# Patient Record
Sex: FEMALE | Race: OTHER | URBAN - METROPOLITAN AREA
[De-identification: names, ages, dates, MRNs, and addresses within clinical notes are randomized per-mention and may not be internally consistent; named-entity substitution may affect disease eponyms.]

---
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
## 2024-01-15 ENCOUNTER — OUTPATIENT (OUTPATIENT)
Dept: OUTPATIENT SERVICES | Facility: HOSPITAL | Age: 19
LOS: 1 days | End: 2024-01-15
Payer: MEDICAID

## 2024-01-15 VITALS
DIASTOLIC BLOOD PRESSURE: 63 MMHG | TEMPERATURE: 98 F | SYSTOLIC BLOOD PRESSURE: 102 MMHG | RESPIRATION RATE: 18 BRPM | HEART RATE: 101 BPM | HEART RATE: 98 BPM | DIASTOLIC BLOOD PRESSURE: 63 MMHG | SYSTOLIC BLOOD PRESSURE: 102 MMHG

## 2024-01-15 VITALS
SYSTOLIC BLOOD PRESSURE: 117 MMHG | RESPIRATION RATE: 18 BRPM | DIASTOLIC BLOOD PRESSURE: 71 MMHG | TEMPERATURE: 98 F | HEART RATE: 104 BPM

## 2024-01-15 DIAGNOSIS — O26.899 OTHER SPECIFIED PREGNANCY RELATED CONDITIONS, UNSPECIFIED TRIMESTER: ICD-10-CM

## 2024-01-15 PROCEDURE — 99212 OFFICE O/P EST SF 10 MIN: CPT | Mod: GC

## 2024-01-15 PROCEDURE — G0463: CPT

## 2024-01-15 NOTE — OB PROVIDER TRIAGE NOTE - HISTORY OF PRESENT ILLNESS
R1 Triage Note    Pt is a 17y/o  at 33+6 seen in triage for decFM and abdominal pain. +FM, -LOF, -ctx, -VB.  Prenatal course has been uncomplicated, she sees Dr. Calvin and Dr. Miguel Ángel Gonzales at [facility]. She reports that yesterday she started feeling sharp abdominal pain on the top L of her uterus. It subsided and she fell asleep last night with no issues. She felt fine throughout the day but again at 4PM she started feeling sharp abdominal pain in the same spot 8.5/10 that is constant and does not come and go. She reports it's worse when she's moving. She also reports that for the last 3 hours she feels like she's not feeling the baby move as much. She also endorses constipation. She otherwise denies chest pain, SOB, N/V, RUQ pain, dysuria, diarrhea, vision changes. She has occasional HA during the pregnancy which get better with Vicks vapo rub and Tylenol.  EFW 5#4    OBHx:   GynHx: denies h/o abnormal paps, STI's, fibroids, cysts  PMHx: denies, previous lumbar puncture for unknown reasons  PSHx: denies  Med: PNV  All: NKDA  SH: denies alcohol, tobacco, or drug use  Psych: anxiety, sees a therapist   R1 Triage Note    Pt is a 19y/o  at 33+6 seen in triage for decFM and abdominal pain. +FM, -LOF, -ctx, -VB.  Prenatal course has been uncomplicated, she sees Dr. Calvin and Dr. Miguel Ángel Gonzales at [facility]. She reports that yesterday she started feeling sharp abdominal pain on the top L of her uterus. It subsided and she fell asleep last night with no issues. She felt fine throughout the day but again at 4PM she started feeling sharp abdominal pain in the same spot 8.5/10 that is constant and does not come and go. She reports it's worse when she's moving. She also reports that for the last 3 hours she feels like she's not feeling the baby move as much. She also endorses constipation. She otherwise denies chest pain, SOB, N/V, RUQ pain, dysuria, diarrhea, vision changes. She has occasional HA during the pregnancy which get better with Vicks vapo rub and Tylenol.  EFW 5#4    OBHx:   GynHx: denies h/o abnormal paps, STI's, fibroids, cysts  PMHx: denies, previous lumbar puncture for unknown reasons  PSHx: denies  Med: PNV  All: NKDA  SH: denies alcohol, tobacco, or drug use  Psych: anxiety, sees a therapist

## 2024-01-15 NOTE — OB PROVIDER TRIAGE NOTE - ATTENDING COMMENTS
ATTG note    Pt seen with and agree with above PGY1 note    17 yo P0 @ 33.6 wks with PNC with provider in [place] c/o abd pain and dec FM.  Pain is point tenderness in center of abdomen.  Denies ctxs/LOF/VB.  Uncomplicated PNC as per pt.    VSS, afebrile  VE-1/long/high/+tone  ABd-gravid, Tender to touch over area with fetal back and butt  NST-reactive with no ctxs  BPP-8/8, vtx, VINICIO-7    17 yo P0 @ 33.6 wks for abd pain that on exam is more cw point tendnerness over abd with fetal parts.  No signs of PTL.  Low normal VINICIO could also be contributing to feeling fetal movement with increased discomfort.  Pain not warranting pain meds.  Pt reassuured and clinically stable.  -stable to dc to home  -Keep scheduled apt with provider  -wants to deliver at [facility] but unable to find provider to take her insurance - encouraged to at least bring records with labs abd u.s  -reviewed precautions to return    ÁNGELA Stacy ATTG note    Pt seen with and agree with above PGY1 note    17 yo P0 @ 33.6 wks with PNC with provider in [place] c/o abd pain and dec FM.  Pain is point tenderness in center of abdomen.  Denies ctxs/LOF/VB.  Uncomplicated PNC as per pt.    VSS, afebrile  VE-1/long/high/+tone  ABd-gravid, Tender to touch over area with fetal back and butt  NST-reactive with no ctxs  BPP-8/8, vtx, VINICIO-7    17 yo P0 @ 33.6 wks for abd pain that on exam is more cw point tendnerness over abd with fetal parts.  No signs of PTL.  Low normal VINICIO could also be contributing to feeling fetal movement with increased discomfort.  Pain not warranting pain meds.  Pt reassuured and clinically stable.  -stable to dc to home  -Keep scheduled apt with provider  -wants to deliver at [facility] but unable to find provider to take her insurance - encouraged to at least bring records with labs abd u.s  -reviewed precautions to return    ÁNGELA Stacy

## 2024-01-15 NOTE — OB RN TRIAGE NOTE - FALL HARM RISK - UNIVERSAL INTERVENTIONS
Bed in lowest position, wheels locked, appropriate side rails in place/Call bell, personal items and telephone in reach/Instruct patient to call for assistance before getting out of bed or chair/Non-slip footwear when patient is out of bed/[place] to call system/Physically safe environment - no spills, clutter or unnecessary equipment/Purposeful Proactive Rounding/Room/bathroom lighting operational, light cord in reach

## 2024-01-15 NOTE — OB PROVIDER TRIAGE NOTE - NSOBPROVIDERNOTE_OBGYN_ALL_OB_FT
A&P: Patient is a 17yo  at 33+6 seen in triage for abdominal pain and decreased fetal movement.    - BPP 8/8, VINICIO 7.2  - NST reactive  - Abdominal discomfort likely related to fetal position, patient acknowledges the area of "hardness" she was feeling is where the fetal rump is. Patient has small BMI and is likely feeling increased discomfort as fetus is growing. Return precautions reviewed with the patient. Does not want to deliver at [facility], recommended that if the patient comes to [facility] to deliver to bring her prenatal labs.     JENNIFER Jordan PGY1  d/w Dr. Stacy A&P: Patient is a 19yo  at 33+6 seen in triage for abdominal pain and decreased fetal movement.    - BPP 8/8, VINICIO 7.2  - NST reactive  - Abdominal discomfort likely related to fetal position, patient acknowledges the area of "hardness" she was feeling is where the fetal rump is. Patient has small BMI and is likely feeling increased discomfort as fetus is growing. Return precautions reviewed with the patient. Does not want to deliver at [facility], recommended that if the patient comes to [facility] to deliver to bring her prenatal labs.     JENNIFER Jordan PGY1  d/w Dr. Stacy A&P: Patient is a 17yo  at 33+6 seen in triage for abdominal pain and decreased fetal movement.    - BPP 8/8, VINICIO 7.2  - NST reactive  - Abdominal discomfort likely related to fetal position, patient acknowledges the area of "hardness" she was feeling is where the fetal rump is. Patient has small BMI and is likely feeling increased discomfort as fetus is growing. Return precautions reviewed with the patient. Does not want to deliver at [facility], recommended that if the patient comes to [facility] to deliver to bring her prenatal labs.     JENNIFER Jordan PGY1  seen with and d/w Dr. Stacy A&P: Patient is a 19yo  at 33+6 seen in triage for abdominal pain and decreased fetal movement.    - BPP 8/8, VINICIO 7.2  - NST reactive  - Abdominal discomfort likely related to fetal position, patient acknowledges the area of "hardness" she was feeling is where the fetal rump is. Patient has small BMI and is likely feeling increased discomfort as fetus is growing. Return precautions reviewed with the patient. Does not want to deliver at [facility], recommended that if the patient comes to [facility] to deliver to bring her prenatal labs.     JENNIFER Jordan PGY1  seen with and d/w Dr. Stacy

## 2024-01-15 NOTE — OB PROVIDER TRIAGE NOTE - NSHPPHYSICALEXAM_GEN_ALL_CORE
EFH: 145/mod/+accels, no decels  [place]: no contractions  VE: 1/0/-3  TAUS: vertex    BPP 8/8, VINICIO 7.2, R posterior placenta  Fetal rump at the area of abdominal discomfort    General: comfortable, NAD  Pulm: unlabored breathing  Abd: gravid, soft, nontender, no RUQ, epigatric or LUQ tenderness. Slight tenderness to palpation over the anterior uterus just below the fundus EFH: 145/mod/+accels, no decels  [place]: no contractions  VE: 1/0/-3  TAUS: vertex    BPP 8/8, VINICIO 7.2, R posterior placenta  Fetal rump at the area of abdominal discomfort    General: comfortable, NAD  Pulm: unlabored breathing  Abd: gravid, soft, nontender, no RUQ, epigatric or LUQ tenderness. Slight tenderness to palpation over the anterior uterus just below the fundus

## 2024-01-17 DIAGNOSIS — O36.8130 DECREASED FETAL MOVEMENTS, THIRD TRIMESTER, NOT APPLICABLE OR UNSPECIFIED: ICD-10-CM

## 2024-01-17 DIAGNOSIS — O99.613 DISEASES OF THE DIGESTIVE SYSTEM COMPLICATING PREGNANCY, THIRD TRIMESTER: ICD-10-CM

## 2024-01-17 DIAGNOSIS — O99.343 OTHER MENTAL DISORDERS COMPLICATING PREGNANCY, THIRD TRIMESTER: ICD-10-CM

## 2024-01-17 DIAGNOSIS — K59.00 CONSTIPATION, UNSPECIFIED: ICD-10-CM

## 2024-01-17 DIAGNOSIS — O26.893 OTHER SPECIFIED PREGNANCY RELATED CONDITIONS, THIRD TRIMESTER: ICD-10-CM

## 2024-01-17 DIAGNOSIS — Z3A.33 33 WEEKS GESTATION OF PREGNANCY: ICD-10-CM

## 2024-01-17 DIAGNOSIS — R10.9 UNSPECIFIED ABDOMINAL PAIN: ICD-10-CM

## 2024-01-17 DIAGNOSIS — R51.9 HEADACHE, UNSPECIFIED: ICD-10-CM

## 2024-01-17 DIAGNOSIS — F41.9 ANXIETY DISORDER, UNSPECIFIED: ICD-10-CM

## 2024-01-31 ENCOUNTER — OUTPATIENT (OUTPATIENT)
Dept: OUTPATIENT SERVICES | Facility: HOSPITAL | Age: 19
LOS: 1 days | End: 2024-01-31
Payer: MEDICAID

## 2024-01-31 DIAGNOSIS — O26.899 OTHER SPECIFIED PREGNANCY RELATED CONDITIONS, UNSPECIFIED TRIMESTER: ICD-10-CM

## 2024-01-31 RX ORDER — SODIUM CHLORIDE 9 MG/ML
1000 INJECTION, SOLUTION INTRAVENOUS ONCE
Refills: 0 | Status: DISCONTINUED | OUTPATIENT
Start: 2024-01-31 | End: 2024-02-15

## 2024-01-31 RX ORDER — SODIUM CHLORIDE 9 MG/ML
500 INJECTION, SOLUTION INTRAVENOUS ONCE
Refills: 0 | Status: COMPLETED | OUTPATIENT
Start: 2024-01-31 | End: 2024-01-31

## 2024-01-31 RX ORDER — SODIUM CHLORIDE 9 MG/ML
1000 INJECTION, SOLUTION INTRAVENOUS
Refills: 0 | Status: DISCONTINUED | OUTPATIENT
Start: 2024-01-31 | End: 2024-02-01

## 2024-01-31 RX ADMIN — SODIUM CHLORIDE 1000 MILLILITER(S): 9 INJECTION, SOLUTION INTRAVENOUS at 18:55

## 2024-01-31 NOTE — OB RN TRIAGE NOTE - NSICDXNOPASTSURGICALHX_GEN_ALL_CORE
Plan:  Keep up the good work with your exercise and diet plan!  We will plan to follow-up again in 1 year.  Please let us know if any concerns arise in the meantime.   <-- Click to add NO significant Past Surgical History

## 2024-01-31 NOTE — OB PROVIDER TRIAGE NOTE - HISTORY OF PRESENT ILLNESS
18yF  @36w1d presents to triage with decreased fetal movement. Patient had decreased fetal movement since yesterday morning. Normally the baby is active on its own but she has only felt movement when eating and when "poking" the baby.  Denies vaginal bleeding, leakage of fluid. Patient goes to [facility] for prenatal care but wanted to deliver at [place].    Patient has been following up with Dr. Puri for pre- care. No complications throughout current pregnancy. No adverse reactions to anesthesia, no objections to blood transfusions if clinically indicated.    PMH: No pertinent PMH  PSH: None   Past OB/GYN Hx:   Meds: PNV  All: NKDA  Social Hx: Denies alcohol, tobacco, drug use. Patient has anxiety, no meds.     PHYSICAL EXAM  Vital Signs Last 24 Hrs  T(C): 36.8 (2024 17:39), Max: 36.8 (2024 17:39)  T(F): 98.2 (2024 17:39), Max: 98.2 (2024 17:39)  HR: 97 (2024 18:21) (86 - 100)  BP: 105/70 (2024 17:39) (105/70 - 117/77)  BP(mean): --  RR: 18 (2024 17:39) (18 - 19)  SpO2: 99% (2024 18:21) (93% - 100%)    Parameters below as of 2024 17:39  Patient On (Oxygen Delivery Method): room air        Gen: NAD  Head: NC/AT  Cardio: S1S2+, RRR  Resp: CTABL, no wheezing  Abdomen: Soft, NT/ND, BS+  Extremities: No LE edema bilaterally    FHR: HR 130s, moderate variability, accelerations present, no decelerations, Category 1.  [place]: Contractions present, regular, <5 contractions over 10 minutes.    Labs

## 2024-01-31 NOTE — OB PROVIDER TRIAGE NOTE - NSOBPROVIDERNOTE_OBGYN_ALL_OB_FT
Asessment: 18yF  at 36w1d presents to triage c/o dec fetal movement since yesterday morning. VINICIO: 5.3 on ultrasound, BPP:8/8.     Plan:  - Will continue antepartum care and management.  - LR Bolus x 1L, LR@125cc/hr for maintenance.  - NPO except meds/ice chips.  - Monitor FHT/toco.    D/w Dr. Cristian Aviles, PGY-1 Asessment: 18yF  at 36w1d presents to triage c/o dec fetal movement since yesterday morning. VINICIO: 5.3 on ultrasound, BPP:8/8.     Plan:  - Will continue antepartum care and management.  - LR Bolus x 1L, LR@125cc/hr for maintenance.  - NPO except meds/ice chips.  - Monitor FHT/toco.    D/w Dr. Cristian Aviles, PGY-1      Addendum 8:30p  - Repeat VINICIO 5.92, MVP 2.8   - patient to receive ATU scan in AM  - observe overnight  - continue IVFH  - will continue to monitor    Lisha Galicia PGY1 Asessment: 18yF  at 36w1d presents to triage c/o dec fetal movement since yesterday morning. VINICIO: 5.3 on ultrasound, BPP:8/8.     Plan:  - Will continue antepartum care and management.  - LR Bolus x 1L, LR@125cc/hr for maintenance.  - NPO except meds/ice chips.  - Monitor FHT/toco.    D/w Dr. Cristian Aviles, PGY-1      Update 24 9AM   - ROR neg, amnisure(-), pooling(-)   - For ATU this AM   - Continue IVFH        Addendum 8:30p  - Repeat VINICIO 5.92, MVP 2.8   - patient to receive ATU scan in AM  - observe overnight  - continue IVFH  - will continue to monitor    Lisha Galicia PGY1 Asessment: 18yF  at 36w1d presents to triage c/o dec fetal movement since yesterday morning. VINICIO: 5.3 on ultrasound, BPP:8/8.     Plan:  - Will continue antepartum care and management.  - LR Bolus x 1L, LR@125cc/hr for maintenance.  - NPO except meds/ice chips.  - Monitor FHT/toco.    D/w Dr. Cristian Aviles, PGY-1      Update 24 9AM   - ROR neg, amnisure(-), pooling(-)   - For ATU this AM   - Continue IVFH        Addendum 8:30p  - Repeat VINICIO 5.92, MVP 2.8   - patient to receive ATU scan in AM  - observe overnight  - continue IVFH  - will continue to monitor    Lisha Galicia PGY1    PA Addendum  ATU sono vinicio 10.7, BPP 10/10  GBS swab sent  DC home with labor precautions  DW Dr Donnie Escobar PAFridaC

## 2024-01-31 NOTE — OB RN TRIAGE NOTE - NSLDARRIVAL_OBGYN_ALL_OB_DIFF_HHMM
pt c/o right ankle redness. has script from pmd to go to hospital for iv abx 19 Hour(s) 45 Minute(s)

## 2024-02-01 ENCOUNTER — ASOB RESULT (OUTPATIENT)
Age: 19
End: 2024-02-01

## 2024-02-01 ENCOUNTER — APPOINTMENT (OUTPATIENT)
Dept: ANTEPARTUM | Facility: CLINIC | Age: 19
End: 2024-02-01
Payer: MEDICAID

## 2024-02-01 VITALS — DIASTOLIC BLOOD PRESSURE: 65 MMHG | SYSTOLIC BLOOD PRESSURE: 106 MMHG | HEART RATE: 90 BPM

## 2024-02-01 PROCEDURE — 76816 OB US FOLLOW-UP PER FETUS: CPT

## 2024-02-01 PROCEDURE — G0463: CPT

## 2024-02-01 PROCEDURE — 76816 OB US FOLLOW-UP PER FETUS: CPT | Mod: 26

## 2024-02-01 PROCEDURE — 76818 FETAL BIOPHYS PROFILE W/NST: CPT

## 2024-02-01 PROCEDURE — G0378: CPT

## 2024-02-01 PROCEDURE — 96361 HYDRATE IV INFUSION ADD-ON: CPT

## 2024-02-01 PROCEDURE — 76818 FETAL BIOPHYS PROFILE W/NST: CPT | Mod: 26,59

## 2024-02-01 PROCEDURE — 96360 HYDRATION IV INFUSION INIT: CPT

## 2024-02-01 PROCEDURE — 87653 STREP B DNA AMP PROBE: CPT

## 2024-02-01 RX ADMIN — SODIUM CHLORIDE 125 MILLILITER(S): 9 INJECTION, SOLUTION INTRAVENOUS at 00:46

## 2024-02-02 DIAGNOSIS — F41.9 ANXIETY DISORDER, UNSPECIFIED: ICD-10-CM

## 2024-02-02 DIAGNOSIS — Z3A.36 36 WEEKS GESTATION OF PREGNANCY: ICD-10-CM

## 2024-02-02 DIAGNOSIS — O36.8130 DECREASED FETAL MOVEMENTS, THIRD TRIMESTER, NOT APPLICABLE OR UNSPECIFIED: ICD-10-CM

## 2024-02-02 DIAGNOSIS — O99.343 OTHER MENTAL DISORDERS COMPLICATING PREGNANCY, THIRD TRIMESTER: ICD-10-CM

## 2024-02-02 PROBLEM — Z00.00 ENCOUNTER FOR PREVENTIVE HEALTH EXAMINATION: Status: ACTIVE | Noted: 2024-02-02

## 2024-02-02 LAB
GROUP B BETA STREP DNA (PCR): SIGNIFICANT CHANGE UP
SOURCE GROUP B STREP: SIGNIFICANT CHANGE UP

## 2024-02-05 ENCOUNTER — ASOB RESULT (OUTPATIENT)
Age: 19
End: 2024-02-05

## 2024-02-05 ENCOUNTER — OUTPATIENT (OUTPATIENT)
Dept: OUTPATIENT SERVICES | Facility: HOSPITAL | Age: 19
LOS: 1 days | End: 2024-02-05
Payer: MEDICAID

## 2024-02-05 ENCOUNTER — APPOINTMENT (OUTPATIENT)
Dept: ANTEPARTUM | Facility: CLINIC | Age: 19
End: 2024-02-05
Payer: MEDICAID

## 2024-02-05 VITALS
OXYGEN SATURATION: 99 % | DIASTOLIC BLOOD PRESSURE: 71 MMHG | SYSTOLIC BLOOD PRESSURE: 110 MMHG | RESPIRATION RATE: 18 BRPM | TEMPERATURE: 98 F | HEART RATE: 87 BPM

## 2024-02-05 VITALS — HEART RATE: 93 BPM | SYSTOLIC BLOOD PRESSURE: 118 MMHG | DIASTOLIC BLOOD PRESSURE: 74 MMHG

## 2024-02-05 DIAGNOSIS — O26.899 OTHER SPECIFIED PREGNANCY RELATED CONDITIONS, UNSPECIFIED TRIMESTER: ICD-10-CM

## 2024-02-05 PROCEDURE — G0463: CPT

## 2024-02-05 PROCEDURE — 76819 FETAL BIOPHYS PROFIL W/O NST: CPT | Mod: 26

## 2024-02-05 PROCEDURE — 99212 OFFICE O/P EST SF 10 MIN: CPT | Mod: 25

## 2024-02-05 PROCEDURE — 83986 ASSAY PH BODY FLUID NOS: CPT

## 2024-02-05 PROCEDURE — 83986 ASSAY PH BODY FLUID NOS: CPT | Mod: QW

## 2024-02-05 NOTE — OB PROVIDER TRIAGE NOTE - HISTORY OF PRESENT ILLNESS
19yo P0  @  36w6d    presents from OB office for evaluation secondary to VINICIO 4-5 and decreased FM  Pt states she was seen here on 1/31 for dec FM and low fluid initially vinicio 5 and then after IVF hydration her vinicio was 10 and she was feeling the baby move and was discharge home.  Today in the office she expressed the baby isnt really moving and repeat us vinicio was 4-5.  Denies LOF or VB or ctx.    PNC: [facility] Dr Puri  PNI: uncomplicated  PNL: GBS negative      All: No Known Allergies  Meds: none  PMHx: Denies  PSHx: Denies  Socialhx: Denies x 3, denies anxiety or depression  OBhx: Primigravid  GYNhx: Denies fibroids, ov cysts or STDs or abnormal pap smears    T(C): 36.7 (02-05-24 @ 12:40), Max: 36.7 (02-05-24 @ 12:38)  HR: 92 (02-05-24 @ 13:01) (87 - 112)  BP: 110/71 (02-05-24 @ 12:40) (110/71 - 110/71)  RR: 18 (02-05-24 @ 12:38) (18 - 18)  SpO2: 100% (02-05-24 @ 13:01) (91% - 100%)    Gen: NAD  Heart: RRR  Lungs: CTA B/L  Abdomen: Gravid, NT  Ext: no calf tenderness    NST: 140's moderate variablity + accels no decels  TOCO:none  VE: 1/50/-3  EFW: 2800      A/P 19yo P 0  @  36w6d   presents with oligohydramnios  - Fetal status - NST/TOCO  - PO hydrate  - GBS neg  - repeat BPP    D/W    Ewa Chambers PA-C     17yo P0  @  36w6d    presents from OB office for evaluation secondary to VINICIO 4-5 and decreased FM  Pt states she was seen here on 1/31 for dec FM and low fluid initially vinicio 5 and then after IVF hydration her vinicio was 10 and she was feeling the baby move and was discharge home.  Today in the office she expressed the baby isnt really moving and repeat us vinicio was 4-5.  Denies LOF or VB or ctx.    PNC: [facility] Dr Puri  PNI: uncomplicated  PNL: GBS negative      All: No Known Allergies  Meds: none  PMHx: Denies  PSHx: Denies  Socialhx: Denies x 3, denies anxiety or depression  OBhx: Primigravid  GYNhx: Denies fibroids, ov cysts or STDs or abnormal pap smears    T(C): 36.7 (02-05-24 @ 12:40), Max: 36.7 (02-05-24 @ 12:38)  HR: 92 (02-05-24 @ 13:01) (87 - 112)  BP: 110/71 (02-05-24 @ 12:40) (110/71 - 110/71)  RR: 18 (02-05-24 @ 12:38) (18 - 18)  SpO2: 100% (02-05-24 @ 13:01) (91% - 100%)    Gen: NAD  Heart: RRR  Lungs: CTA B/L  Abdomen: Gravid, NT  Ext: no calf tenderness    NST: 140's moderate variablity + accels no decels  TOCO:none  VE: 1/50/-3  EFW: 2529gm - 17% vtx, VINICIO 10.7 (2/1)      A/P 17yo P 0  @  36w6d   presents with oligohydramnios  - Fetal status - NST/TOCO  - PO hydrate  - GBS neg  - repeat BPP    D/W    Ewa Chambers PA-C     17yo P0  @  36w6d    presents from OB office for evaluation secondary to VINICIO 4-5 and decreased FM  Pt states she was seen here on 1/31 for dec FM and low fluid initially vinicio 5 and then after IVF hydration her vinicio was 10 and she was feeling the baby move and was discharged home.  Today in the office she expressed the baby isnt really moving and repeat us vinicio was 4-5.  Denies LOF or VB or ctx.    PNC: [facility] Dr Puri  PNI: uncomplicated  PNL: GBS negative      All: No Known Allergies  Meds: none  PMHx: Denies  PSHx: Denies  Socialhx: Denies x 3, denies anxiety or depression  OBhx: Primigravid  GYNhx: Denies fibroids, ov cysts or STDs or abnormal pap smears    T(C): 36.7 (02-05-24 @ 12:40), Max: 36.7 (02-05-24 @ 12:38)  HR: 92 (02-05-24 @ 13:01) (87 - 112)  BP: 110/71 (02-05-24 @ 12:40) (110/71 - 110/71)  RR: 18 (02-05-24 @ 12:38) (18 - 18)  SpO2: 100% (02-05-24 @ 13:01) (91% - 100%)    Gen: NAD  Heart: RRR  Lungs: CTA B/L  Abdomen: Gravid, NT  Ext: no calf tenderness    NST: 140's moderate variablity + accels no decels  TOCO:none  VE: 1/50/-3  EFW: 2529gm - 17% vtx, VINICIO 10.7 (2/1)      A/P 17yo P 0  @  36w6d   presents with oligohydramnios and dec FM  - Fetal status - NST/TOCO  - PO hydrate  - GBS neg  - repeat BPP    D/W  Dr Jennie Chambers PA-C

## 2024-02-05 NOTE — OB PROVIDER TRIAGE NOTE - NS ATTEND AMEND GEN_ALL_CORE FT
OB  Note    17 yo P0 at 36w6d sent in from OB office ([facility]) for decreased fetal movement and VINICIO 4-5cm.   SVE /-3, ruptured membranes ruled out.   GBS negative but has records in her car.   Patient PO hydrating. Patient is normotensive.   Reactive NST. Last growth 2529g (17%ile).   My BPP is 10/10 with VINICIO 5.5cm (MVP 3cm); fetus urinated during scan with subsequent MVP noted of 4.55cm.   At this time, there is no indication for iatrogenic  delivery given BPP 10x10 with presence of adequate fluid pocket and VINICIO >5cm. Patient to request follow up with [facility] within the next week.     Yousif Schneider MD
0 = understands/communicates without difficulty

## 2024-02-05 NOTE — OB PROVIDER TRIAGE NOTE - NSOBPROVIDERNOTE_OBGYN_ALL_OB_FT
A/P 19yo P 0  @  36w6d   presents with oligohydramnios  - Fetal status - NST/TOCO  - PO hydrate  - GBS neg  - repeat BPP    D/W    Ewa Chambers PA-C A/P 17yo P 0  @  36w6d   presents with oligohydramnios and dec FM  - Fetal status - NST/TOCO  - PO hydrate  - GBS neg  - repeat BPP    D/W    Ewa Chambers PA-C    OB PA Addendum @ 2pm    T(C): 36.7 (02-05-24 @ 12:40), Max: 36.7 (02-05-24 @ 12:38)  HR: 93 (02-05-24 @ 13:59) (80 - 112)  BP: 118/74 (02-05-24 @ 13:59) (110/71 - 118/74)  RR: 18 (02-05-24 @ 12:38) (18 - 18)  SpO2: 99% (02-05-24 @ 13:58) (91% - 100%)    Pt now feeling FM in triage, denies other complaints  Ultrasound performed by Dr Schneider   vtx, VINICIO 5.53,    reactive  No evidence of ruptured membranes on spec exam, no evidence of labor  D/W Dr Schneider  plan is to discharge patient home  pt to f/u in office this week  daily FKC  increase hydration  labor precautions given    Ewa Chambers PA-C

## 2024-02-05 NOTE — OB PROVIDER TRIAGE NOTE - ADDITIONAL INSTRUCTIONS
D/W Dr Schneider  plan is to discharge patient home  pt to f/u in office this week  daily FKC  increase hydration  labor precautions given    Ewa Chambers PA-C

## 2024-02-07 DIAGNOSIS — F41.9 ANXIETY DISORDER, UNSPECIFIED: ICD-10-CM

## 2024-02-07 DIAGNOSIS — Z3A.36 36 WEEKS GESTATION OF PREGNANCY: ICD-10-CM

## 2024-02-07 DIAGNOSIS — O99.343 OTHER MENTAL DISORDERS COMPLICATING PREGNANCY, THIRD TRIMESTER: ICD-10-CM

## 2024-02-07 DIAGNOSIS — O36.8130 DECREASED FETAL MOVEMENTS, THIRD TRIMESTER, NOT APPLICABLE OR UNSPECIFIED: ICD-10-CM

## 2024-02-07 DIAGNOSIS — O41.03X0 OLIGOHYDRAMNIOS, THIRD TRIMESTER, NOT APPLICABLE OR UNSPECIFIED: ICD-10-CM

## 2024-02-19 ENCOUNTER — OUTPATIENT (OUTPATIENT)
Dept: OUTPATIENT SERVICES | Facility: HOSPITAL | Age: 19
LOS: 1 days | End: 2024-02-19
Payer: MEDICAID

## 2024-02-19 VITALS
HEART RATE: 105 BPM | RESPIRATION RATE: 17 BRPM | SYSTOLIC BLOOD PRESSURE: 117 MMHG | DIASTOLIC BLOOD PRESSURE: 81 MMHG | TEMPERATURE: 98 F

## 2024-02-19 VITALS
SYSTOLIC BLOOD PRESSURE: 128 MMHG | DIASTOLIC BLOOD PRESSURE: 84 MMHG | TEMPERATURE: 99 F | RESPIRATION RATE: 20 BRPM | HEART RATE: 102 BPM

## 2024-02-19 DIAGNOSIS — O26.899 OTHER SPECIFIED PREGNANCY RELATED CONDITIONS, UNSPECIFIED TRIMESTER: ICD-10-CM

## 2024-02-19 PROCEDURE — 99213 OFFICE O/P EST LOW 20 MIN: CPT | Mod: GC

## 2024-02-19 PROCEDURE — G0463: CPT

## 2024-02-19 RX ORDER — ACETAMINOPHEN 500 MG
975 TABLET ORAL ONCE
Refills: 0 | Status: COMPLETED | OUTPATIENT
Start: 2024-02-19 | End: 2024-02-19

## 2024-02-19 RX ADMIN — Medication 975 MILLIGRAM(S): at 18:02

## 2024-02-19 NOTE — OB PROVIDER TRIAGE NOTE - HISTORY OF PRESENT ILLNESS
R2 Triage    Subjective  HPI: 17yo  at 38w6d presents for decreased fetal movement. Reports baby was moving normally until 9 am this morning. After that she only felt baby move once. She is tolerating PO and ate breakfast and lunch today. -LOF. -CTXs. -VB. Pt also reports tooth pain and a sore on her right upper lip. Denies sores on other parts of the body.    PNC: Denies prenatal issues. GBS nrg.  EFW 5#9 on .  OBHx: G1  GynHx: denies  PMH: Anxiety, previously saw therapist  PSH: denies  Meds: PNV   Allergies: NKDA

## 2024-02-19 NOTE — OB PROVIDER TRIAGE NOTE - NSHPPHYSICALEXAM_GEN_ALL_CORE
Objective  VS  T(C): 37 (02-19-24 @ 17:16)  HR: 104 (02-19-24 @ 18:12)  BP: 128/84 (02-19-24 @ 17:16)  RR: 20 (02-19-24 @ 17:16)  SpO2: 99% (02-19-24 @ 18:12)    PE:   HEENT: 3 mm red lump on right upper lip centrally; no drainage  CV: clinically well perfused  Pulm: breathing comfortably on RA  Abd: gravid, nontender  Extr: moving all extremities with ease    FHT: baseline 125, mod variability, +accels, -decels  Brunson: acontractile  Sono: vertex, VINICIO 6.5 (MVP 4), BPP 8/8, right anterior placenta Objective  VS  T(C): 37 (02-19-24 @ 17:16)  HR: 104 (02-19-24 @ 18:12)  BP: 128/84 (02-19-24 @ 17:16)  RR: 20 (02-19-24 @ 17:16)  SpO2: 99% (02-19-24 @ 18:12)    PE:   HEENT: 3 mm red lump on right upper lip centrally; no drainage  CV: clinically well perfused  Pulm: breathing comfortably on RA  Abd: gravid, nontender  Extr: moving all extremities with ease    FHT: baseline 125, mod variability, +accels, -decels  [place]: acontractile  Sono: vertex, VINICIO 6.5 (MVP 4), BPP 8/8, right posterior placenta

## 2024-02-19 NOTE — OB PROVIDER TRIAGE NOTE - NSOBPROVIDERNOTE_OBGYN_ALL_OB_FT
HPI: 19yo  at 38w6d presents for decreased fetal movement. Patient reports feeling increased movement during evaluation. Fetal status reassuring with BPP 8/8 and NST reactive.    Plan  - Tylenol 975 mg now for tooth pain  - Patient instructed to keep lesion on lip clean and reach out to primary MD if worsens or she feels fever/chills, or to reach out to OB if vaginal lesions appear  - NST reviewed by PGY3 Kailey  - Patient cleared for discharge with strict return precautions. She is to follow-up with her OB this week.    D/w Dr. Kian Melchor PGY2

## 2024-02-21 DIAGNOSIS — F41.9 ANXIETY DISORDER, UNSPECIFIED: ICD-10-CM

## 2024-02-21 DIAGNOSIS — O36.8130 DECREASED FETAL MOVEMENTS, THIRD TRIMESTER, NOT APPLICABLE OR UNSPECIFIED: ICD-10-CM

## 2024-02-21 DIAGNOSIS — Z3A.38 38 WEEKS GESTATION OF PREGNANCY: ICD-10-CM

## 2024-02-21 DIAGNOSIS — O99.343 OTHER MENTAL DISORDERS COMPLICATING PREGNANCY, THIRD TRIMESTER: ICD-10-CM

## 2024-02-25 ENCOUNTER — INPATIENT (INPATIENT)
Facility: HOSPITAL | Age: 19
LOS: 0 days | Discharge: HOME | End: 2024-02-26
Attending: OBSTETRICS & GYNECOLOGY | Admitting: OBSTETRICS & GYNECOLOGY
Payer: MEDICAID

## 2024-02-25 VITALS
HEART RATE: 89 BPM | DIASTOLIC BLOOD PRESSURE: 77 MMHG | OXYGEN SATURATION: 100 % | TEMPERATURE: 98 F | SYSTOLIC BLOOD PRESSURE: 117 MMHG

## 2024-02-25 DIAGNOSIS — Z34.80 ENCOUNTER FOR SUPERVISION OF OTHER NORMAL PREGNANCY, UNSPECIFIED TRIMESTER: ICD-10-CM

## 2024-02-25 DIAGNOSIS — O26.899 OTHER SPECIFIED PREGNANCY RELATED CONDITIONS, UNSPECIFIED TRIMESTER: ICD-10-CM

## 2024-02-25 LAB
BASOPHILS # BLD AUTO: 0.04 K/UL — SIGNIFICANT CHANGE UP (ref 0–0.2)
BASOPHILS NFR BLD AUTO: 0.3 % — SIGNIFICANT CHANGE UP (ref 0–2)
EOSINOPHIL # BLD AUTO: 0.08 K/UL — SIGNIFICANT CHANGE UP (ref 0–0.5)
EOSINOPHIL NFR BLD AUTO: 0.6 % — SIGNIFICANT CHANGE UP (ref 0–6)
HCT VFR BLD CALC: 36.9 % — SIGNIFICANT CHANGE UP (ref 34.5–45)
HGB BLD-MCNC: 12.3 G/DL — SIGNIFICANT CHANGE UP (ref 11.5–15.5)
IMM GRANULOCYTES NFR BLD AUTO: 0.5 % — SIGNIFICANT CHANGE UP (ref 0–0.9)
LYMPHOCYTES # BLD AUTO: 2.94 K/UL — SIGNIFICANT CHANGE UP (ref 1–3.3)
LYMPHOCYTES # BLD AUTO: 22.7 % — SIGNIFICANT CHANGE UP (ref 13–44)
MCHC RBC-ENTMCNC: 28.9 PG — SIGNIFICANT CHANGE UP (ref 27–34)
MCHC RBC-ENTMCNC: 33.3 GM/DL — SIGNIFICANT CHANGE UP (ref 32–36)
MCV RBC AUTO: 86.6 FL — SIGNIFICANT CHANGE UP (ref 80–100)
MONOCYTES # BLD AUTO: 0.94 K/UL — HIGH (ref 0–0.9)
MONOCYTES NFR BLD AUTO: 7.2 % — SIGNIFICANT CHANGE UP (ref 2–14)
NEUTROPHILS # BLD AUTO: 8.91 K/UL — HIGH (ref 1.8–7.4)
NEUTROPHILS NFR BLD AUTO: 68.7 % — SIGNIFICANT CHANGE UP (ref 43–77)
NRBC # BLD: 0 /100 WBCS — SIGNIFICANT CHANGE UP (ref 0–0)
PLATELET # BLD AUTO: 259 K/UL — SIGNIFICANT CHANGE UP (ref 150–400)
RBC # BLD: 4.26 M/UL — SIGNIFICANT CHANGE UP (ref 3.8–5.2)
RBC # FLD: 13.2 % — SIGNIFICANT CHANGE UP (ref 10.3–14.5)
WBC # BLD: 12.98 K/UL — HIGH (ref 3.8–10.5)
WBC # FLD AUTO: 12.98 K/UL — HIGH (ref 3.8–10.5)

## 2024-02-25 PROCEDURE — 59409 OBSTETRICAL CARE: CPT | Mod: U9

## 2024-02-25 RX ORDER — MAGNESIUM HYDROXIDE 400 MG/1
30 TABLET, CHEWABLE ORAL
Refills: 0 | Status: DISCONTINUED | OUTPATIENT
Start: 2024-02-25 | End: 2024-02-26

## 2024-02-25 RX ORDER — TETANUS TOXOID, REDUCED DIPHTHERIA TOXOID AND ACELLULAR PERTUSSIS VACCINE, ADSORBED 5; 2.5; 8; 8; 2.5 [IU]/.5ML; [IU]/.5ML; UG/.5ML; UG/.5ML; UG/.5ML
0.5 SUSPENSION INTRAMUSCULAR ONCE
Refills: 0 | Status: DISCONTINUED | OUTPATIENT
Start: 2024-02-25 | End: 2024-02-26

## 2024-02-25 RX ORDER — SIMETHICONE 80 MG/1
80 TABLET, CHEWABLE ORAL EVERY 4 HOURS
Refills: 0 | Status: DISCONTINUED | OUTPATIENT
Start: 2024-02-25 | End: 2024-02-26

## 2024-02-25 RX ORDER — BENZOCAINE 10 %
1 GEL (GRAM) MUCOUS MEMBRANE EVERY 6 HOURS
Refills: 0 | Status: DISCONTINUED | OUTPATIENT
Start: 2024-02-25 | End: 2024-02-26

## 2024-02-25 RX ORDER — OXYTOCIN 10 UNIT/ML
41.67 VIAL (ML) INJECTION
Qty: 20 | Refills: 0 | Status: DISCONTINUED | OUTPATIENT
Start: 2024-02-25 | End: 2024-02-26

## 2024-02-25 RX ORDER — OXYCODONE HYDROCHLORIDE 5 MG/1
5 TABLET ORAL
Refills: 0 | Status: DISCONTINUED | OUTPATIENT
Start: 2024-02-25 | End: 2024-02-26

## 2024-02-25 RX ORDER — AER TRAVELER 0.5 G/1
1 SOLUTION RECTAL; TOPICAL EVERY 4 HOURS
Refills: 0 | Status: DISCONTINUED | OUTPATIENT
Start: 2024-02-25 | End: 2024-02-26

## 2024-02-25 RX ORDER — CITRIC ACID/SODIUM CITRATE 300-500 MG
15 SOLUTION, ORAL ORAL EVERY 6 HOURS
Refills: 0 | Status: DISCONTINUED | OUTPATIENT
Start: 2024-02-25 | End: 2024-02-25

## 2024-02-25 RX ORDER — LANOLIN
1 OINTMENT (GRAM) TOPICAL EVERY 6 HOURS
Refills: 0 | Status: DISCONTINUED | OUTPATIENT
Start: 2024-02-25 | End: 2024-02-26

## 2024-02-25 RX ORDER — OXYCODONE HYDROCHLORIDE 5 MG/1
5 TABLET ORAL ONCE
Refills: 0 | Status: DISCONTINUED | OUTPATIENT
Start: 2024-02-25 | End: 2024-02-26

## 2024-02-25 RX ORDER — SODIUM CHLORIDE 9 MG/ML
3 INJECTION INTRAMUSCULAR; INTRAVENOUS; SUBCUTANEOUS EVERY 8 HOURS
Refills: 0 | Status: DISCONTINUED | OUTPATIENT
Start: 2024-02-25 | End: 2024-02-26

## 2024-02-25 RX ORDER — CHLORHEXIDINE GLUCONATE 213 G/1000ML
1 SOLUTION TOPICAL DAILY
Refills: 0 | Status: DISCONTINUED | OUTPATIENT
Start: 2024-02-25 | End: 2024-02-25

## 2024-02-25 RX ORDER — OXYTOCIN 10 UNIT/ML
2 VIAL (ML) INJECTION
Qty: 30 | Refills: 0 | Status: DISCONTINUED | OUTPATIENT
Start: 2024-02-25 | End: 2024-02-26

## 2024-02-25 RX ORDER — OXYTOCIN 10 UNIT/ML
333.33 VIAL (ML) INJECTION
Qty: 20 | Refills: 0 | Status: DISCONTINUED | OUTPATIENT
Start: 2024-02-25 | End: 2024-02-26

## 2024-02-25 RX ORDER — IBUPROFEN 200 MG
600 TABLET ORAL EVERY 6 HOURS
Refills: 0 | Status: DISCONTINUED | OUTPATIENT
Start: 2024-02-25 | End: 2024-02-26

## 2024-02-25 RX ORDER — ACETAMINOPHEN 500 MG
975 TABLET ORAL
Refills: 0 | Status: DISCONTINUED | OUTPATIENT
Start: 2024-02-25 | End: 2024-02-26

## 2024-02-25 RX ORDER — IBUPROFEN 200 MG
600 TABLET ORAL EVERY 6 HOURS
Refills: 0 | Status: COMPLETED | OUTPATIENT
Start: 2024-02-25 | End: 2025-01-23

## 2024-02-25 RX ORDER — DIPHENHYDRAMINE HCL 50 MG
25 CAPSULE ORAL EVERY 6 HOURS
Refills: 0 | Status: DISCONTINUED | OUTPATIENT
Start: 2024-02-25 | End: 2024-02-26

## 2024-02-25 RX ORDER — SODIUM CHLORIDE 9 MG/ML
1000 INJECTION INTRAMUSCULAR; INTRAVENOUS; SUBCUTANEOUS
Refills: 0 | Status: DISCONTINUED | OUTPATIENT
Start: 2024-02-25 | End: 2024-02-26

## 2024-02-25 RX ORDER — KETOROLAC TROMETHAMINE 30 MG/ML
30 SYRINGE (ML) INJECTION ONCE
Refills: 0 | Status: DISCONTINUED | OUTPATIENT
Start: 2024-02-25 | End: 2024-02-25

## 2024-02-25 RX ORDER — SODIUM CHLORIDE 9 MG/ML
1000 INJECTION, SOLUTION INTRAVENOUS
Refills: 0 | Status: DISCONTINUED | OUTPATIENT
Start: 2024-02-25 | End: 2024-02-25

## 2024-02-25 RX ORDER — OXYTOCIN 10 UNIT/ML
10 VIAL (ML) INJECTION ONCE
Refills: 0 | Status: COMPLETED | OUTPATIENT
Start: 2024-02-25 | End: 2024-02-25

## 2024-02-25 RX ORDER — SODIUM CHLORIDE 9 MG/ML
1000 INJECTION, SOLUTION INTRAVENOUS
Refills: 0 | Status: DISCONTINUED | OUTPATIENT
Start: 2024-02-25 | End: 2024-02-26

## 2024-02-25 RX ORDER — HYDROCORTISONE 1 %
1 OINTMENT (GRAM) TOPICAL EVERY 6 HOURS
Refills: 0 | Status: DISCONTINUED | OUTPATIENT
Start: 2024-02-25 | End: 2024-02-26

## 2024-02-25 RX ORDER — DIBUCAINE 1 %
1 OINTMENT (GRAM) RECTAL EVERY 6 HOURS
Refills: 0 | Status: DISCONTINUED | OUTPATIENT
Start: 2024-02-25 | End: 2024-02-26

## 2024-02-25 RX ORDER — SODIUM CHLORIDE 9 MG/ML
500 INJECTION INTRAMUSCULAR; INTRAVENOUS; SUBCUTANEOUS ONCE
Refills: 0 | Status: COMPLETED | OUTPATIENT
Start: 2024-02-25 | End: 2024-02-25

## 2024-02-25 RX ORDER — PRAMOXINE HYDROCHLORIDE 150 MG/15G
1 AEROSOL, FOAM RECTAL EVERY 4 HOURS
Refills: 0 | Status: DISCONTINUED | OUTPATIENT
Start: 2024-02-25 | End: 2024-02-26

## 2024-02-25 RX ADMIN — Medication 600 MILLIGRAM(S): at 23:49

## 2024-02-25 RX ADMIN — Medication 2 MILLIUNIT(S)/MIN: at 07:39

## 2024-02-25 RX ADMIN — Medication 15 MILLILITER(S): at 12:28

## 2024-02-25 RX ADMIN — SODIUM CHLORIDE 150 MILLILITER(S): 9 INJECTION INTRAMUSCULAR; INTRAVENOUS; SUBCUTANEOUS at 12:55

## 2024-02-25 RX ADMIN — SODIUM CHLORIDE 125 MILLILITER(S): 9 INJECTION, SOLUTION INTRAVENOUS at 05:45

## 2024-02-25 RX ADMIN — SODIUM CHLORIDE 1000 MILLILITER(S): 9 INJECTION INTRAMUSCULAR; INTRAVENOUS; SUBCUTANEOUS at 12:21

## 2024-02-25 RX ADMIN — SODIUM CHLORIDE 125 MILLILITER(S): 9 INJECTION, SOLUTION INTRAVENOUS at 05:46

## 2024-02-25 RX ADMIN — Medication 975 MILLIGRAM(S): at 22:30

## 2024-02-25 RX ADMIN — Medication 975 MILLIGRAM(S): at 21:38

## 2024-02-25 RX ADMIN — Medication 30 MILLIGRAM(S): at 15:08

## 2024-02-25 RX ADMIN — Medication 10 UNIT(S): at 13:57

## 2024-02-25 RX ADMIN — SODIUM CHLORIDE 3 MILLILITER(S): 9 INJECTION INTRAMUSCULAR; INTRAVENOUS; SUBCUTANEOUS at 22:40

## 2024-02-25 RX ADMIN — Medication 125 MILLIUNIT(S)/MIN: at 13:55

## 2024-02-25 RX ADMIN — Medication 0.2 MILLIGRAM(S): at 14:00

## 2024-02-25 NOTE — OB RN PATIENT PROFILE - FALL HARM RISK - UNIVERSAL INTERVENTIONS
Bed in lowest position, wheels locked, appropriate side rails in place/Call bell, personal items and telephone in reach/Instruct patient to call for assistance before getting out of bed or chair/Non-slip footwear when patient is out of bed/[place] to call system/Physically safe environment - no spills, clutter or unnecessary equipment/Purposeful Proactive Rounding/Room/bathroom lighting operational, light cord in reach

## 2024-02-25 NOTE — OB PROVIDER H&P - NSLOWPPHRISK_OBGYN_A_OB
No previous uterine incision/Colon Pregnancy/Less than or equal to 4 previous vaginal births/No known bleeding disorder/No history of postpartum hemorrhage

## 2024-02-25 NOTE — OB RN TRIAGE NOTE - FALL HARM RISK - FALLEN IN PAST
22/F c/o headache and diarreha x2 days. Pt reports being 8 weeks pregnant. Pt 
c/o 6/10 headache. G-1 P0. Denies any vaginal bleeding. Denies any s/sx of UTI. No

## 2024-02-25 NOTE — CHART NOTE - NSCHARTNOTEFT_GEN_A_CORE
R4 Chart Note     Assumed care from night team at 6AM. Patient admitted for induction for Cat 2 FHR. Cervical balloon placed at 4AM. Tracing reviewed and has been Cat 1 for >2h. Will now start pitocin.     Leah Ramon MD PGY4   d/w Dr. Li

## 2024-02-25 NOTE — OB PROVIDER DELIVERY SUMMARY - NSPROVIDERDELIVERYNOTE_OBGYN_ALL_OB_FT
Spontaneous vaginal delivery of liveborn infant from MEHREEN position. Head, shoulders, and body delivered easily. Infant was suctioned. No mec. Delayed cord clamping and infant was passed to mother. Cord clamped and cut. Placenta delivered intact with a 3 vessel cord. Fundal massage was given and uterine fundus was found to be firm. Due to increased bleeding, IM methergine, IM pitocin, and rectal cytotec administered. Vaginal exam revealed an intact cervix, vaginal walls and sulci. Patient had bilateral periurethral lacerations that were repaired with 3-0 chromic suture. Excellent hemostasis was noted. Patient was stable and went to recovery. Count was correct x 2.     Lenore Block, PGY-1 Spontaneous vaginal delivery of liveborn infant from MEHREEN position. Head, shoulders, and body delivered easily. Infant was suctioned. No meconium noted. Delayed cord clamping and infant was passed to mother. Cord clamped and cut. Placenta delivered intact with a 3 vessel cord. Fundal massage was given and uterine fundus was found to be firm. Due to increased bleeding, IM methergine, IM pitocin, and rectal cytotec administered. Vaginal exam revealed an intact cervix, vaginal walls and sulci. Patient had bilateral periurethral lacerations that were repaired with 3-0 Vicryl suture. Excellent hemostasis was noted. Patient was stable and went to recovery. Count was correct x 2.     Lenore Block, PGY-1

## 2024-02-25 NOTE — OB NEONATOLOGY/PEDIATRICIAN DELIVERY SUMMARY - NSPEDSNEONOTESA_OBGYN_ALL_OB_FT
Baby boy born at 39 5/7 wks via  to a 19 y/o  mother who is B+ blood type, HBsAg neg, HIV neg, rubella immune, RPR neg, GBS neg as of . Maternal history of anxiety (no medication). No prenatal history. ROM at 1152 with clear fluids. Baby emerged with good tone. Infant placed skin to skin. Infant dried and stimulated. APGARS of 8/8 given. Mom would like to breast/bottle feed, and consents circ.

## 2024-02-25 NOTE — OB RN DELIVERY SUMMARY - NSSELHIDDEN_OBGYN_ALL_OB_FT
[NS_[email or web address]_ALL_OB_FT:[id number]],[NS_DeliveryAssist1_OBGYN_ALL_OB_FT:[id number]],[NS_DeliveryAssist2_OBGYN_ALL_OB_FT:[id number]],[NS_DeliveryRN_OBGYN_ALL_OB_FT:[id number]]

## 2024-02-25 NOTE — OB PROVIDER LABOR PROGRESS NOTE - ASSESSMENT
Pt is a 19yo  admitted for IOL for category 2 now s/p cervical balloon.    - Continue cont EFM, toco, IVF  - Continue IOL with pitocin    D/w Dr. Tristen Block MD PGY1 Pt is a 17yo  admitted for IOL for category 2 now s/p cervical balloon.    - Continue cont EFM, toco, IVF  - Continue IOL with pitocin    D/w Dr. Tristen Block MD PGY1    Attending Note     Patient turned over by night team. Assessed at bedside. PMH and PSH reviewed. NKDA   Fetal monitoring reassuring   Pitocin at 4mu/min   -continue pitocin titration   -continue EFM and toco   -pain control HONEY Tsai

## 2024-02-25 NOTE — OB PROVIDER H&P - NS_BABIESUTERO_OBGYN_ALL_OB_NU
Relevant Problems   No relevant active problems       Anesthetic History   No history of anesthetic complications            Review of Systems / Medical History  Patient summary reviewed and pertinent labs reviewed    Pulmonary          Undiagnosed apnea         Neuro/Psych   Within defined limits           Cardiovascular    Hypertension              Exercise tolerance: >4 METS     GI/Hepatic/Renal     GERD: well controlled           Endo/Other    Diabetes: well controlled, type 2    Morbid obesity     Other Findings              Physical Exam    Airway  Mallampati: II  TM Distance: 4 - 6 cm  Neck ROM: normal range of motion   Mouth opening: Normal     Cardiovascular    Rhythm: regular  Rate: normal         Dental         Pulmonary  Breath sounds clear to auscultation               Abdominal         Other Findings            Anesthetic Plan    ASA: 3  Anesthesia type: general          Induction: Intravenous  Anesthetic plan and risks discussed with: Patient 1

## 2024-02-25 NOTE — OB PROVIDER LABOR PROGRESS NOTE - NS_OBIHIFHRDETAILS_OBGYN_ALL_OB_FT
125/mod/+accel/+decels intermittent
135, mod shari, recurrent variables
Baseline: 130 bpm, moderate variability,  + accels, - decels

## 2024-02-25 NOTE — OB PROVIDER H&P - NSHPPHYSICALEXAM_GEN_ALL_CORE
FHR: 145/mod/+acels/+late decels FHR: 145/mod/+acels/+late decels  VINICIO: 8.38, MVP: 4.15   TAUS: vertex

## 2024-02-25 NOTE — OB PROVIDER LABOR PROGRESS NOTE - ASSESSMENT
Plan  resucitation measures in progress   IUPC placed, amnioinfusion started   continue repositioning   FHR with moderate variability, cont pitocin at this time     Leah Ramon MD PGY4   d/w Dr. Foster

## 2024-02-25 NOTE — OB PROVIDER TRIAGE NOTE - HISTORY OF PRESENT ILLNESS
R2 Triage    Subjective  HPI: 19yo  at 38w6d presents for r/o rupture. Patient felt trickle of fluid at 12:30am. +FM, -CTXs. -VB.     PNC: Patient follows with OBGYN at [facility] but wants to deliver at [place]. Denies prenatal issues. GBS neg    OBHx: denies  GynHx: denies  PMH: Anxiety, previously saw therapist  PSH: denies  Meds: PNV   Allergies: NKDA

## 2024-02-25 NOTE — OB RN TRIAGE NOTE - FALL HARM RISK - UNIVERSAL INTERVENTIONS
Bed in lowest position, wheels locked, appropriate side rails in place/Call bell, personal items and telephone in reach/Instruct patient to call for assistance before getting out of bed or chair/Non-slip footwear when patient is out of bed/[place] to call system/Physically safe environment - no spills, clutter or unnecessary equipment/Purposeful Proactive Rounding/Room/bathroom lighting operational, light cord in reach

## 2024-02-25 NOTE — PRE-ANESTHESIA EVALUATION ADULT - NSANTHOSAYNRD_GEN_A_CORE
No. RUFUS screening performed.  STOP BANG Legend: 0-2 = LOW Risk; 3-4 = INTERMEDIATE Risk; 5-8 = HIGH Risk

## 2024-02-25 NOTE — OB PROVIDER LABOR PROGRESS NOTE - ASSESSMENT
P0 IOL for cat 2 tracing, patient stable now fully dilated with cat 2 tracing undergoing resuscitation  - Patient instructed not to push epidural bolus button, will start pushing shortly  - Anticipate   - Pitocin paused due to decels    D/w Dr. Cristian Melchor PGY2

## 2024-02-25 NOTE — OB PROVIDER TRIAGE NOTE - NSHPPHYSICALEXAM_GEN_ALL_CORE
PE:   HEENT: 3 mm red lump on right upper lip centrally; no drainage  CV: clinically well perfused  Pulm: breathing comfortably on RA  Abd: gravid, nontender  Extr: moving all extremities with ease    FHT: 135/mod/+acels/-decels  [place]olo: acontractile

## 2024-02-25 NOTE — OB PROVIDER H&P - HISTORY OF PRESENT ILLNESS
R2 Triage    Subjective  HPI: 17yo  at 39w5d presents for r/o rupture. Patient felt trickle of fluid at 12:30am. Patient was not ruptured but had category 2 tracing with late decels and moderate variability. Patient admitted for category 2 induction.     PNC: Patient follows with OBGYVALDEZ Umaña at [facility] but wants to deliver at [place]. Denies prenatal issues. GBS neg    OBHx: denies  GynHx: denies  PMH: Anxiety, previously saw therapist  PSH: denies  Meds: PNV   Allergies: NKDA Subjective  HPI: 19yo  at 39w5d presents for r/o rupture. Patient felt trickle of fluid at 12:30am. Patient was not ruptured but had category 2 tracing with late decels and moderate variability. Patient admitted for category 2 induction.   GBS neg    PNC: Patient follows with OBGYN Dr. Umaña at [facility] but wants to deliver at [place].   Triage visits:   - 1/15 dec FM and abdominal pain -> BPP 10/10, VINICIO 7.2   -  p/w VINICIO 5 subsequently VINICIO 10 after IV fluids  -  evaluation of VINICIO 4-5 in office and dec FM. Dec FM resolved at triage with VINICIO 5.53, BPP 10/10   -  p/w dec FM ->BPP 10/10    OBHx: denies  GynHx: denies  PMH: Anxiety, previously saw therapist  PSH: denies  Meds: PNV   Allergies: NKDA

## 2024-02-25 NOTE — OB PROVIDER TRIAGE NOTE - NSOBPROVIDERNOTE_OBGYN_ALL_OB_FT
HPI: 19yo  at 39w5d presents for rule out rupture.   - neg pooling, neg Nitrazine, neg ferning  - VE: /-2; no contractions   - Reactive NST   - Dispo: home     D/w Dr. Matthew Aviles, PGY-1

## 2024-02-25 NOTE — OB RN PATIENT PROFILE - FUNCTIONAL ASSESSMENT - BASIC MOBILITY 6.
4-calculated by average/Not able to assess (calculate score using [facility] averaging method)

## 2024-02-25 NOTE — OB PROVIDER LABOR PROGRESS NOTE - NS_SUBJECTIVE/OBJECTIVE_OBGYN_ALL_OB_FT
Patient seen st bedside for discontinuous tracing. Discussed ISE and patient is agreeable however able to trace baby from external monitor while in the room. Patient comfortable.
R4 Labor Note     Patient seen and examined for Cat 2 FHR. Recurrent variable and late decels following AROM
S:  Pt seen and examined s/p cervical balloon    O:  Vital Signs Last 24 Hrs  T(C): 36.7 (25 Feb 2024 06:52), Max: 36.9 (25 Feb 2024 04:56)  T(F): 98.06 (25 Feb 2024 06:52), Max: 98.42 (25 Feb 2024 04:56)  HR: 90 (25 Feb 2024 08:14) (72 - 107)  BP: 99/67 (25 Feb 2024 06:52) (99/67 - 117/82)  BP(mean): --  RR: 16 (25 Feb 2024 04:00) (16 - 116)  SpO2: 100% (25 Feb 2024 08:14) (88% - 100%)    Parameters below as of 25 Feb 2024 04:00  Patient On (Oxygen Delivery Method): room air

## 2024-02-25 NOTE — OB RN DELIVERY SUMMARY - NS_SEPSISRSKCALC_OBGYN_ALL_OB_FT
EOS calculated successfully. EOS Risk Factor: 0.5/1000 live births ([facility] national incidence); GA=39w5d; Temp=98.96; ROM=1.983; GBS='Negative'; Antibiotics='No antibiotics or any antibiotics < 2 hrs prior to birth'

## 2024-02-25 NOTE — OB PROVIDER DELIVERY SUMMARY - NSSELHIDDEN_OBGYN_ALL_OB_FT
[NS_DeliveryAttending1_OBGYN_ALL_OB_FT:[id number]],[NS_DeliveryAssist1_OBGYN_ALL_OB_FT:[id number]],[NS_DeliveryAssist2_OBGYN_ALL_OB_FT:[id number]]

## 2024-02-26 ENCOUNTER — TRANSCRIPTION ENCOUNTER (OUTPATIENT)
Age: 19
End: 2024-02-26

## 2024-02-26 VITALS
SYSTOLIC BLOOD PRESSURE: 112 MMHG | TEMPERATURE: 98 F | DIASTOLIC BLOOD PRESSURE: 74 MMHG | RESPIRATION RATE: 18 BRPM | OXYGEN SATURATION: 97 % | HEART RATE: 100 BPM

## 2024-02-26 LAB — T PALLIDUM AB TITR SER: NEGATIVE — SIGNIFICANT CHANGE UP

## 2024-02-26 PROCEDURE — 86850 RBC ANTIBODY SCREEN: CPT

## 2024-02-26 PROCEDURE — 86780 TREPONEMA PALLIDUM: CPT

## 2024-02-26 PROCEDURE — 59050 FETAL MONITOR W/REPORT: CPT

## 2024-02-26 PROCEDURE — 85025 COMPLETE CBC W/AUTO DIFF WBC: CPT

## 2024-02-26 PROCEDURE — 86901 BLOOD TYPING SEROLOGIC RH(D): CPT

## 2024-02-26 PROCEDURE — 86900 BLOOD TYPING SEROLOGIC ABO: CPT

## 2024-02-26 PROCEDURE — 36415 COLL VENOUS BLD VENIPUNCTURE: CPT

## 2024-02-26 RX ORDER — ACETAMINOPHEN 500 MG
3 TABLET ORAL
Qty: 0 | Refills: 0 | DISCHARGE
Start: 2024-02-26

## 2024-02-26 RX ORDER — IBUPROFEN 200 MG
1 TABLET ORAL
Qty: 0 | Refills: 0 | DISCHARGE
Start: 2024-02-26

## 2024-02-26 RX ORDER — NORETHINDRONE 0.35 MG/1
1 TABLET ORAL
Qty: 30 | Refills: 3
Start: 2024-02-26

## 2024-02-26 RX ADMIN — Medication 975 MILLIGRAM(S): at 03:12

## 2024-02-26 RX ADMIN — Medication 975 MILLIGRAM(S): at 15:36

## 2024-02-26 RX ADMIN — Medication 975 MILLIGRAM(S): at 09:53

## 2024-02-26 RX ADMIN — Medication 975 MILLIGRAM(S): at 04:12

## 2024-02-26 RX ADMIN — Medication 1 TABLET(S): at 12:08

## 2024-02-26 RX ADMIN — Medication 600 MILLIGRAM(S): at 06:40

## 2024-02-26 RX ADMIN — Medication 600 MILLIGRAM(S): at 00:48

## 2024-02-26 RX ADMIN — Medication 975 MILLIGRAM(S): at 09:23

## 2024-02-26 RX ADMIN — Medication 600 MILLIGRAM(S): at 05:50

## 2024-02-26 RX ADMIN — Medication 600 MILLIGRAM(S): at 12:38

## 2024-02-26 RX ADMIN — Medication 600 MILLIGRAM(S): at 12:08

## 2024-02-26 NOTE — DISCHARGE NOTE OB - MATERIALS PROVIDED
Vaccinations/Breastfeeding Log/Breastfeeding Mother’s Support Group Information/Guide to Postpartum Care/Breastfeeding Guide and Packet/Birth Certificate Instructions

## 2024-02-26 NOTE — PROGRESS NOTE ADULT - ATTENDING COMMENTS
Patient is PPD #1 s/p . Reports adequate pain control. Denies chest pain, shortness of breath, nausea/vomiting. Tolerating diet, ambulating and voiding spontaneously.   Vitals reviewed   Gen: no acute distress   Abd: soft, nontender and fundus firm beneath umbilicus   Perineum: minimal lochia rubra   Ext: no calf tenderness   Labs and meds reviewed   A/P: PPD#1 s/p , doing well  -continue PRN Pain control   -encourage ambulation  -monitor vaginal bleeding   -declines contraception     Male infant   -for circ, f/u clearance      Dispo planning     QI Tsai

## 2024-02-26 NOTE — DISCHARGE NOTE OB - HOSPITAL COURSE
Patient was admitted to labor and delivery for induction of labor for category 2 fetal heart tracing.  Pt had an uncomplicated vaginal delivery followed by an uncomplicated postpartum course.  EBL: 300  On Postpartum day 2, patient was discharged home in stable condition, voiding spontaneously, pain well controlled, ambulating, tolerating PO and with normal vital signs. Patient will receive progesterone only pills for postpartum birth control.  [facility]. Telephone number and clinic information provided prior to discharge.

## 2024-02-26 NOTE — PROGRESS NOTE ADULT - ASSESSMENT
19y/o  PPD#1 from .  Overall, patient stable and recovering well postpartum.    #Postpartum state  - Continue with po analgesia  - Increase ambulation  - Continue regular diet    Misa Aviles  PGY-1

## 2024-02-26 NOTE — DISCHARGE NOTE OB - CARE PLAN
1 Principal Discharge DX:	Normal vaginal delivery  Assessment and plan of treatment:	Make your follow-up appointment with your doctor as ordered.  Make an appt in 6 weeks for a routine postpartum visit.     No heavy lifting, driving, or strenuous activity for 6 weeks. Nothing per vagina such as tampons, intercourse, douches, or tub baths for 6 weeks or until you see your doctor. Call your doctor with any signs and symptoms of infection such as fever, chills, nausea, or vomiting. Call your doctor if you're unable to tolerate food, increase in vaginal bleeding, or have difficulty urinating. Call your doctor if you have pain that is not relieved by your prescribed medications. Notify your doctor with any other concerns.

## 2024-02-26 NOTE — DISCHARGE NOTE OB - NS MD DC FALL RISK RISK
For information on Fall & Injury Prevention, visit: [email or web address] OR  [email or web address] OR  https://www.cdc.gov/steadi/patient.html

## 2024-02-26 NOTE — DISCHARGE NOTE OB - PROVIDER TOKENS
FREE:[LAST:[[facility]],PHONE:[(   )    -],FAX:[(   )    -],ADDRESS:[[address] [place] [address], [place] [address] [phone]]]

## 2024-02-26 NOTE — DISCHARGE NOTE OB - PATIENT PORTAL LINK FT
You can access the FollowMyHealth Patient Portal offered by [facility] by registering at the following website: [email or web address]. By joining [employer]’s FollowMyHealth portal, you will also be able to view your health information using other applications (apps) compatible with our system.

## 2024-02-26 NOTE — DISCHARGE NOTE OB - MEDICATION SUMMARY - MEDICATIONS TO TAKE
I will START or STAY ON the medications listed below when I get home from the hospital:    ibuprofen 600 mg oral tablet  -- 1 tab(s) by mouth every 6 hours  -- Indication: For pain    acetaminophen 325 mg oral tablet  -- 3 tab(s) by mouth every 6 hours  -- Indication: For pain    norethindrone 0.35 mg oral tablet  -- 1 tab(s) by mouth once a day  -- Indication: For birth control

## 2024-02-26 NOTE — PROGRESS NOTE ADULT - SUBJECTIVE AND OBJECTIVE BOX
R1 PPD#1   Progress Note    Patient seen and examined at bedside, no acute overnight events. No acute complaints, pain well controlled. Patient is ambulating, voiding spontaneously, passing gas, and tolerating regular diet. Denies CP, SOB, N/V, HA, blurred vision, epigastric pain. Bleeding minimal.    Vital Signs Last 24 Hours  T(C): 36.4 (24 @ 05:33), Max: 37.2 (24 @ 11:03)  HR: 82 (24 @ 05:33) (68 - 133)  BP: 98/61 (24 @ 05:33) (89/51 - 176/140)  RR: 18 (24 @ 05:33) (16 - 18)  SpO2: 97% (24 @ 05:33) (69% - 100%)    Physical Exam:  General: NAD  Abdomen: Soft, non-tender, non-distended, fundus firm  Pelvic: Lochia wnl    Labs:    Blood Type: B Positive  Antibody Screen: Negative               12.3   12.98 )-----------( 259      (  @ 03:42 )             36.9         MEDICATIONS  (STANDING):  acetaminophen     Tablet .. 975 milliGRAM(s) Oral <User Schedule>  dextrose 5%. 1000 milliLiter(s) (125 mL/Hr) IV Continuous <Continuous>  diphtheria/tetanus/pertussis (acellular) Vaccine (Adacel) 0.5 milliLiter(s) IntraMuscular once  ibuprofen  Tablet. 600 milliGRAM(s) Oral every 6 hours  oxytocin Infusion 41.667 milliUNIT(s)/Min (125 mL/Hr) IV Continuous <Continuous>  oxytocin Infusion 333.333 milliUNIT(s)/Min (1000 mL/Hr) IV Continuous <Continuous>  oxytocin Infusion. 2 milliUNIT(s)/Min (2 mL/Hr) IV Continuous <Continuous>  prenatal multivitamin 1 Tablet(s) Oral daily  sodium chloride 0.9% lock flush 3 milliLiter(s) IV Push every 8 hours  sodium chloride 0.9%. 1000 milliLiter(s) (150 mL/Hr) IV Continuous <Continuous>    MEDICATIONS  (PRN):  benzocaine 20%/menthol 0.5% Spray 1 Spray(s) Topical every 6 hours PRN for Perineal discomfort  dibucaine 1% Ointment 1 Application(s) Topical every 6 hours PRN Perineal discomfort  diphenhydrAMINE 25 milliGRAM(s) Oral every 6 hours PRN Pruritus  hydrocortisone 1% Cream 1 Application(s) Topical every 6 hours PRN Moderate Pain (4-6)  lanolin Ointment 1 Application(s) Topical every 6 hours PRN nipple soreness  magnesium hydroxide Suspension 30 milliLiter(s) Oral two times a day PRN Constipation  oxyCODONE    IR 5 milliGRAM(s) Oral every 3 hours PRN Moderate to Severe Pain (4-10)  oxyCODONE    IR 5 milliGRAM(s) Oral once PRN Moderate to Severe Pain (4-10)  pramoxine 1%/zinc 5% Cream 1 Application(s) Topical every 4 hours PRN Moderate Pain (4-6)  simethicone 80 milliGRAM(s) Chew every 4 hours PRN Gas  witch hazel Pads 1 Application(s) Topical every 4 hours PRN Perineal discomfort

## 2024-02-26 NOTE — DISCHARGE NOTE OB - CARE PROVIDER_API CALL
[facility],   [address] [place] [address], [facility], [place] [address]   [phone]  Phone: (   )    -  Fax: (   )    -  Follow Up Time:

## 2024-02-26 NOTE — DISCHARGE NOTE OB - PLAN OF CARE
Make your follow-up appointment with your doctor as ordered.  Make an appt in 6 weeks for a routine postpartum visit.     No heavy lifting, driving, or strenuous activity for 6 weeks. Nothing per vagina such as tampons, intercourse, douches, or tub baths for 6 weeks or until you see your doctor. Call your doctor with any signs and symptoms of infection such as fever, chills, nausea, or vomiting. Call your doctor if you're unable to tolerate food, increase in vaginal bleeding, or have difficulty urinating. Call your doctor if you have pain that is not relieved by your prescribed medications. Notify your doctor with any other concerns.

## 2024-05-12 NOTE — OB PROVIDER H&P - NSCORESITESY/N_GEN_A_CORE_RD
[FreeTextEntry1] : Exam. Right 5th toe and left 5th toe  keratotic lesions were debrided. Right 1st toenail, right 2nd toenail, right 4th toenail, left 1st toenail, left 3rd toenail and left 5th toenail fungal toe nails were debrided using manual cutters and electrical  to patient tolerance. Trimmed elongated toe nails with sterile manual nippers to patient tolerance. Patient to return in 9 weeks.
No

## 2025-03-06 NOTE — OB RN TRIAGE NOTE - RESPIRATORY RATE (BREATHS/MIN)
20
[No studies available for review at this time.] : No studies available for review at this time.
[No studies available for review at this time.] : No studies available for review at this time.

## 2025-03-17 NOTE — OB RN TRIAGE NOTE - NSDCHEARTRATE_OBGYN_A_OB_NU
Requesting medication refill. Please approve or deny this request.    Rx requested:  Requested Prescriptions     Pending Prescriptions Disp Refills    clopidogrel (PLAVIX) 75 MG tablet [Pharmacy Med Name: clopidogrel 75 mg tablet] 90 tablet 3     Sig: Take 1 tablet by mouth daily         Last Office Visit:   1/28/2025      Next Visit Date:  Future Appointments   Date Time Provider Department Center   3/19/2025 12:30 PM SCHEDULE, [facility]   7/16/2025 11:30 AM Abiel Burnette MD [facility]   7/22/2025 12:00 PM Marc Banda MD [facility]       
98